# Patient Record
Sex: MALE | Race: WHITE
[De-identification: names, ages, dates, MRNs, and addresses within clinical notes are randomized per-mention and may not be internally consistent; named-entity substitution may affect disease eponyms.]

---

## 2017-02-14 ENCOUNTER — HOSPITAL ENCOUNTER (OUTPATIENT)
Dept: HOSPITAL 45 - C.LABBFT | Age: 82
Discharge: HOME | End: 2017-02-14
Attending: INTERNAL MEDICINE
Payer: COMMERCIAL

## 2017-02-14 DIAGNOSIS — D64.9: Primary | ICD-10-CM

## 2017-02-14 LAB
ALBUMIN/GLOB SERPL: 1.1 {RATIO} (ref 0.9–2)
ALP SERPL-CCNC: 70 U/L (ref 45–117)
ALT SERPL-CCNC: 25 U/L (ref 12–78)
ANION GAP SERPL CALC-SCNC: 11 MMOL/L (ref 3–11)
AST SERPL-CCNC: 23 U/L (ref 15–37)
BUN SERPL-MCNC: 18 MG/DL (ref 7–18)
BUN/CREAT SERPL: 13.1 (ref 10–20)
CALCIUM SERPL-MCNC: 8.9 MG/DL (ref 8.5–10.1)
CHLORIDE SERPL-SCNC: 104 MMOL/L (ref 98–107)
CHOLEST/HDLC SERPL: 3.4 {RATIO}
CO2 SERPL-SCNC: 25 MMOL/L (ref 21–32)
CREAT SERPL-MCNC: 1.4 MG/DL (ref 0.6–1.4)
EOSINOPHIL NFR BLD AUTO: 169 K/UL (ref 130–400)
GLOBULIN SER-MCNC: 3.5 GM/DL (ref 2.5–4)
GLUCOSE SERPL-MCNC: 93 MG/DL (ref 70–99)
GLUCOSE UR QL: 45 MG/DL
HCT VFR BLD CALC: 41.2 % (ref 42–52)
KETONES UR QL STRIP: 86 MG/DL
MCH RBC QN AUTO: 32.5 PG (ref 25–34)
MCHC RBC AUTO-ENTMCNC: 34.5 G/DL (ref 32–36)
MCV RBC AUTO: 94.3 FL (ref 80–100)
NITRITE UR QL STRIP: 114 MG/DL (ref 0–150)
PH UR: 154 MG/DL (ref 0–200)
PMV BLD AUTO: 11.8 FL (ref 7.4–10.4)
POTASSIUM SERPL-SCNC: 4.3 MMOL/L (ref 3.5–5.1)
RBC # BLD AUTO: 4.37 M/UL (ref 4.7–6.1)
SODIUM SERPL-SCNC: 140 MMOL/L (ref 136–145)
TSH SERPL-ACNC: 3.84 UIU/ML (ref 0.3–4.5)
VERY LOW DENSITY LIPOPROT CALC: 23 MG/DL
WBC # BLD AUTO: 7.41 K/UL (ref 4.8–10.8)

## 2018-02-20 ENCOUNTER — HOSPITAL ENCOUNTER (OUTPATIENT)
Dept: HOSPITAL 45 - C.LABBFT | Age: 83
Discharge: HOME | End: 2018-02-20
Attending: INTERNAL MEDICINE
Payer: COMMERCIAL

## 2018-02-20 DIAGNOSIS — I10: Primary | ICD-10-CM

## 2018-02-20 DIAGNOSIS — E03.9: ICD-10-CM

## 2018-02-20 DIAGNOSIS — I48.2: ICD-10-CM

## 2018-02-20 DIAGNOSIS — Z79.01: ICD-10-CM

## 2018-02-20 LAB
ALBUMIN SERPL-MCNC: 3.6 GM/DL (ref 3.4–5)
ALP SERPL-CCNC: 81 U/L (ref 45–117)
ALT SERPL-CCNC: 27 U/L (ref 12–78)
AST SERPL-CCNC: 22 U/L (ref 15–37)
BUN SERPL-MCNC: 22 MG/DL (ref 7–18)
CALCIUM SERPL-MCNC: 8.9 MG/DL (ref 8.5–10.1)
CO2 SERPL-SCNC: 29 MMOL/L (ref 21–32)
CREAT SERPL-MCNC: 1.52 MG/DL (ref 0.6–1.4)
EOSINOPHIL NFR BLD AUTO: 153 K/UL (ref 130–400)
GLUCOSE SERPL-MCNC: 92 MG/DL (ref 70–99)
HCT VFR BLD CALC: 40.9 % (ref 42–52)
HGB BLD-MCNC: 14.1 G/DL (ref 14–18)
MCH RBC QN AUTO: 32.3 PG (ref 25–34)
MCHC RBC AUTO-ENTMCNC: 34.5 G/DL (ref 32–36)
MCV RBC AUTO: 93.6 FL (ref 80–100)
PMV BLD AUTO: 11.6 FL (ref 7.4–10.4)
POTASSIUM SERPL-SCNC: 4.2 MMOL/L (ref 3.5–5.1)
PROT SERPL-MCNC: 7.5 GM/DL (ref 6.4–8.2)
RED CELL DISTRIBUTION WIDTH CV: 13.4 % (ref 11.5–14.5)
RED CELL DISTRIBUTION WIDTH SD: 46.4 FL (ref 36.4–46.3)
SODIUM SERPL-SCNC: 137 MMOL/L (ref 136–145)
WBC # BLD AUTO: 7.42 K/UL (ref 4.8–10.8)

## 2018-03-19 ENCOUNTER — HOSPITAL ENCOUNTER (OUTPATIENT)
Dept: HOSPITAL 45 - C.EDB | Age: 83
Setting detail: OBSERVATION
LOS: 2 days | Discharge: HOME | End: 2018-03-21
Attending: HOSPITALIST | Admitting: STUDENT IN AN ORGANIZED HEALTH CARE EDUCATION/TRAINING PROGRAM
Payer: COMMERCIAL

## 2018-03-19 VITALS
BODY MASS INDEX: 32.25 KG/M2 | HEIGHT: 67 IN | BODY MASS INDEX: 32.25 KG/M2 | HEIGHT: 67 IN | WEIGHT: 205.47 LBS | WEIGHT: 205.47 LBS

## 2018-03-19 DIAGNOSIS — R79.89: ICD-10-CM

## 2018-03-19 DIAGNOSIS — R07.9: Primary | ICD-10-CM

## 2018-03-19 DIAGNOSIS — I48.0: ICD-10-CM

## 2018-03-19 DIAGNOSIS — Z79.01: ICD-10-CM

## 2018-03-19 DIAGNOSIS — E03.9: ICD-10-CM

## 2018-03-19 DIAGNOSIS — K81.1: ICD-10-CM

## 2018-03-19 DIAGNOSIS — K21.9: ICD-10-CM

## 2018-03-19 DIAGNOSIS — Z79.82: ICD-10-CM

## 2018-03-19 DIAGNOSIS — N18.9: ICD-10-CM

## 2018-03-19 LAB
ALBUMIN SERPL-MCNC: 3.9 GM/DL (ref 3.4–5)
ALP SERPL-CCNC: 98 U/L (ref 45–117)
ALT SERPL-CCNC: 47 U/L (ref 12–78)
AST SERPL-CCNC: 70 U/L (ref 15–37)
BASOPHILS # BLD: 0.03 K/UL (ref 0–0.2)
BASOPHILS NFR BLD: 0.3 %
BUN SERPL-MCNC: 23 MG/DL (ref 7–18)
CA-I BLD-SCNC: 1.16 MMOL/L (ref 1.12–1.32)
CALCIUM SERPL-MCNC: 8.9 MG/DL (ref 8.5–10.1)
CK MB SERPL-MCNC: 2.4 NG/ML (ref 0.5–3.6)
CO2 SERPL-SCNC: 29 MMOL/L (ref 21–32)
CREAT BLD-MCNC: 1.4 MG/DL (ref 0.6–1.3)
CREAT SERPL-MCNC: 1.46 MG/DL (ref 0.6–1.4)
EOS ABS #: 0.27 K/UL (ref 0–0.5)
EOSINOPHIL NFR BLD AUTO: 143 K/UL (ref 130–400)
GLUCOSE SERPL-MCNC: 106 MG/DL (ref 70–99)
HCT VFR BLD CALC: 40.7 % (ref 42–52)
HGB BLD-MCNC: 14.2 G/DL (ref 14–18)
IG#: 0.02 K/UL (ref 0–0.02)
IMM GRANULOCYTES NFR BLD AUTO: 11.3 %
INR PPP: 1.7 (ref 0.9–1.1)
ISTAT POTASSIUM: 4.2 MEQ/L (ref 3.3–5)
LIPASE: 143 U/L (ref 73–393)
LYMPHOCYTES # BLD: 1.18 K/UL (ref 1.2–3.4)
MCH RBC QN AUTO: 32.2 PG (ref 25–34)
MCHC RBC AUTO-ENTMCNC: 34.9 G/DL (ref 32–36)
MCV RBC AUTO: 92.3 FL (ref 80–100)
MONO ABS #: 0.67 K/UL (ref 0.11–0.59)
MONOCYTES NFR BLD: 6.4 %
NEUT ABS #: 8.26 K/UL (ref 1.4–6.5)
NEUTROPHILS # BLD AUTO: 2.6 %
NEUTROPHILS NFR BLD AUTO: 79.2 %
PMV BLD AUTO: 11 FL (ref 7.4–10.4)
POTASSIUM SERPL-SCNC: 4.2 MMOL/L (ref 3.5–5.1)
PROT SERPL-MCNC: 7.8 GM/DL (ref 6.4–8.2)
PTT PATIENT: 33 SECONDS (ref 21–31)
RED CELL DISTRIBUTION WIDTH CV: 13.5 % (ref 11.5–14.5)
RED CELL DISTRIBUTION WIDTH SD: 45.7 FL (ref 36.4–46.3)
SODIUM BLD-SCNC: 137 MEQ/L (ref 135–144)
SODIUM SERPL-SCNC: 135 MMOL/L (ref 136–145)
WBC # BLD AUTO: 10.43 K/UL (ref 4.8–10.8)

## 2018-03-19 NOTE — EMERGENCY ROOM VISIT NOTE
History


Report prepared by Bakari:  Ibrahima Lousi


Under the Supervision of:  Dr. Gerald Frost M.D.


First contact with patient:  21:01


Chief Complaint:  CHEST PAIN


Stated Complaint:  CHEST PRESSURE AND INDIGESTION





History of Present Illness


The patient is an 82 year old male who presents to the Emergency Room with 

complaints of improving chest pain starting around 1700 tonight. He describes 

the pain as a burning sensation. The patient additionally notes that he has 

been belching and having some indigestion, and the pain came before eating 

dinner. He also was feeling warm, and he states that he has upper back pain, 

though he says that he pulled a muscle a couple of days ago. The patient states 

that he has had indigestion for the past couple of weeks, though he states that 

it has felt a little it different today. He has a history of A-fib, and he 

states that he has not had any bypass or stents placed. He states that he has 

not been around anyone sick recently that he knows of. Pt denies LOC, headache, 

fevers, chills, diaphoresis, visual changes, neck pain, breathing difficulties, 

nausea, vomiting, abdominal pain, melena, hematochezia, urinary symptoms, 

numbness, weakness, lymphadenopathy, rash, or other complaints.





   Source of History:  patient


   Onset:  1700 tonight


   Position:  chest


   Quality:  burning


   Timing:  other (improving)


   Associated Symptoms:  + back pain


Note:


Associated symptoms: Belching and indigestion





Review of Systems


See HPI for pertinent positives and negatives.  A total of ten systems were 

reviewed and were otherwise negative.





Past Medical & Surgical


Medical Problems:


(1) Atrial fibrillation


(2) Nasal bleeding








Social History


Smoking Status:  Never Smoker


Alcohol Use:  none


Marital Status:  


Occupation Status:  retired





Current/Historical Medications


Scheduled


Aspirin Enteric Coated (Ecotrin Or Generic *), 81 MG PO DAILY


Losartan Potassium & Hydrochlo (Hyzaar), 1 TABLET PO DAILY


Metoprolol Succ (Toprol Xl) (Toprol-Xl ), 100 MG PO DAILY


Pantoprazole (Protonix), 40 MG PO DAILY


Warfarin Sod (Coumadin *), 2.5 MG PO 3XWK


Warfarin Sod (Coumadin *), 5 MG PO 4XWK





Allergies


Coded Allergies:  


     No Known Allergies (Verified  Allergy, Unknown, 4/4/05)





Physical Exam


Vital Signs











  Date Time  Temp Pulse Resp B/P (MAP) Pulse Ox O2 Delivery O2 Flow Rate FiO2


 


3/19/18 21:42      Room Air  


 


3/19/18 21:42      Room Air  


 


3/19/18 21:16  82      


 


3/19/18 20:49 36.3 92 20 158/94 96 Room Air  











Physical Exam


GENERAL: Awake, alert, well-appearing, in no distress


HENT: Normocephalic, atraumatic. Oropharynx unremarkable.


EYES: Normal conjunctiva. Sclera non-icteric.


NECK: Supple. No nuchal rigidity. FROM. No masses.


RESPIRATORY: Clear to auscultation. No wheezes. No rales.  Normal respiratory 

effort.


CARDIAC: Normal rate.  Normal rhythm. No murmurs.  No rubs. Extremities warm 

and well perfused. Pulses equal.  No JVD.


GI: Soft, non-distended. No tenderness to palpation. No rebound or guarding. No 

masses.


RECTAL: Deferred.


MUSCULOSKELETAL: Atraumatic. Chest examination reveals no tenderness. The back 

is symmetrical on inspection without obvious abnormality. There is no CVA 

tenderness to palpation. No joint edema. 


LOWER EXTREMITIES: 1+ edema. Calves are equal size bilaterally and non-tender. 

No discoloration. 


NEURO: Normal sensorium. No sensory or motor deficits noted. 


SKIN: No rash or jaundice noted.





Medical Decision & Procedures


ER Provider


Diagnostic Interpretation:


Radiology results as stated below per my review and radiologist interpretation: 











CHEST ONE VIEW PORTABLE





CLINICAL HISTORY: Atypical chest pain    





COMPARISON STUDY:  April 2006





FINDINGS: The cardiac and mediastinal contours remain stable. There is stable


elevation the right hemidiaphragm. There is no failure. There is no lobar


consolidation. There are no pleural effusions. There are mild basilar


atelectatic changes.[ 





IMPRESSION: No active disease in the chest.





Electronically signed by:  Maged Patterson M.D.


3/19/2018 9:32 PM





Dictated Date/Time:  3/19/2018 9:32 PM





Laboratory Results


3/19/18 21:10








Red Blood Count 4.41, Mean Corpuscular Volume 92.3, Mean Corpuscular Hemoglobin 

32.2, Mean Corpuscular Hemoglobin Concent 34.9, Mean Platelet Volume 11.0, 

Neutrophils (%) (Auto) 79.2, Lymphocytes (%) (Auto) 11.3, Monocytes (%) (Auto) 

6.4, Eosinophils (%) (Auto) 2.6, Basophils (%) (Auto) 0.3, Neutrophils # (Auto) 

8.26, Lymphocytes # (Auto) 1.18, Monocytes # (Auto) 0.67, Eosinophils # (Auto) 

0.27, Basophils # (Auto) 0.03














Test


  3/19/18


21:10 3/19/18


21:20


 


White Blood Count


  10.43 K/uL


(4.8-10.8) 


 


 


Red Blood Count


  4.41 M/uL


(4.7-6.1) 


 


 


Hemoglobin


  14.2 g/dL


(14.0-18.0) 


 


 


Hematocrit 40.7 % (42-52)  


 


Mean Corpuscular Volume


  92.3 fL


() 


 


 


Mean Corpuscular Hemoglobin


  32.2 pg


(25-34) 


 


 


Mean Corpuscular Hemoglobin


Concent 34.9 g/dl


(32-36) 


 


 


Platelet Count


  143 K/uL


(130-400) 


 


 


Mean Platelet Volume


  11.0 fL


(7.4-10.4) 


 


 


Neutrophils (%) (Auto) 79.2 %  


 


Lymphocytes (%) (Auto) 11.3 %  


 


Monocytes (%) (Auto) 6.4 %  


 


Eosinophils (%) (Auto) 2.6 %  


 


Basophils (%) (Auto) 0.3 %  


 


Neutrophils # (Auto)


  8.26 K/uL


(1.4-6.5) 


 


 


Lymphocytes # (Auto)


  1.18 K/uL


(1.2-3.4) 


 


 


Monocytes # (Auto)


  0.67 K/uL


(0.11-0.59) 


 


 


Eosinophils # (Auto)


  0.27 K/uL


(0-0.5) 


 


 


Basophils # (Auto)


  0.03 K/uL


(0-0.2) 


 


 


RDW Standard Deviation


  45.7 fL


(36.4-46.3) 


 


 


RDW Coefficient of Variation


  13.5 %


(11.5-14.5) 


 


 


Immature Granulocyte % (Auto) 0.2 %  


 


Immature Granulocyte # (Auto)


  0.02 K/uL


(0.00-0.02) 


 


 


Prothrombin Time


  17.9 SECONDS


(9.0-12.0) 


 


 


Prothromb Time International


Ratio 1.7 (0.9-1.1) 


  


 


 


Activated Partial


Thromboplast Time 33.0 SECONDS


(21.0-31.0) 


 


 


Partial Thromboplastin Ratio 1.3  


 


Total Bilirubin


  0.8 mg/dl


(0.2-1) 


 


 


Direct Bilirubin


  0.4 mg/dl


(0-0.2) 


 


 


Aspartate Amino Transf


(AST/SGOT) 70 U/L (15-37) 


  


 


 


Alanine Aminotransferase


(ALT/SGPT) 47 U/L (12-78) 


  


 


 


Alkaline Phosphatase


  98 U/L


() 


 


 


Total Creatine Kinase


  125 U/L


() 


 


 


Creatine Kinase MB


  2.4 ng/ml


(0.5-3.6) 


 


 


Creatine Kinase MB Ratio 1.9 (0-3.0)  


 


Troponin I


  < 0.015 ng/ml


(0-0.045) 


 


 


Total Protein


  7.8 gm/dl


(6.4-8.2) 


 


 


Albumin


  3.9 gm/dl


(3.4-5.0) 


 


 


Lipase


  143 U/L


() 


 


 


Bedside Hemoglobin


  


  15.0 g/dl


(14.0-18.0)


 


Bedside Hematocrit  44 % (42-52) 


 


Bedside Sodium


  


  137 mEq/L


(135-144)


 


Bedside Potassium


  


  4.2 mEq/L


(3.3-5.0)


 


Bedside Chloride


  


  99 mEq/L


(101-112)


 


Bedside Total CO2


  


  28 mEq/l


(24-31)


 


Anion Gap


  


  15.0 mmol/L


(16-25)


 


Bedside Blood Urea Nitrogen


  


  24 mg/dl


(7-18)


 


Bedside Creatinine


  


  1.4 mg/dl


(0.6-1.3)


 


Bedside Glucose (other)


  


  115 mg/dl


(70-99)


 


Bedside Ionized Calcium (Mary)


  


  1.16 mmol/l


(1.12-1.32)








Laboratory results reviewed by me





ECG Per My Interpretation


Indication:  chest pain


Rate (beats per minute):  76


Rhythm:  atrial fibrillation


Findings:  nonspecific-ST abn, ST depression (subtle anterior)


Comparison ECG Date:  3/7/11


Change:


A fib has replaced sinus and ST segment changes are now present.





ED Course


2101: The patient was evaluated in room C12. A complete history and physical 

exam was performed.





Medical Decision





Triage Nursing notes reviewed and agree them.





Additional history obtained from the family.





The patient's history was concerning for chest pain.  





Differential diagnosis:


Etiologies such as cardiac ischemia, aortic dissection, pulmonary embolism, 

pneumonia, pneumothorax, musculoskeletal, infections, pericarditis, myocarditis

, esophageal rupture, gastrointestinal, as well as others were entertained. 





Physical examination:


As above.


 





ER treatment provided:


No medication given.  The patient had taken aspirin and warfarin today.  The 

patient was pain-free.


On reassessment the patient felt well.





Diagnostic interpretation by me:


The electrocardiogram as above.  Subtle ST changes.





The labs revealed an unremarkable CBC and chemistry panel.  Cardiac markers 

negative.  Mild elevation of LFTs.





Imaging studies:


Chest x-ray as above.  Ultrasound pending.





The patient has some worrisome chest pain.  Further cardiac evaluation will be 

necessary.





Consultation:


A consultation was placed with the hospitalist. The case was discussed and 

diagnostics were reviewed.  The patient was evaluated in the ER for further 

treatment.





Medication Reconcilliation


Current Medication List:  was personally reviewed by me





Blood Pressure Screening


Patient's blood pressure:  Elevated blood pressure


Monitored by the hospitalist





Consults


Consulting Physician:  Dr. Lanier and Dr. Ab Bryson [facility] Hospitalist





Impression





 Primary Impression:  


 Substernal chest pain





Scribe Attestation


The scribe's documentation has been prepared under my direction and personally 

reviewed by me in its entirety. I confirm that the note above accurately 

reflects all work, treatment, procedures, and medical decision making performed 

by me.





Departure Information


Dispostion


Being Evaluated By Hospitalist





Referrals


Lio Whiteside M.D. (PCP)





Patient Instructions


My [facility]

## 2018-03-20 VITALS
TEMPERATURE: 97.52 F | HEART RATE: 85 BPM | DIASTOLIC BLOOD PRESSURE: 98 MMHG | SYSTOLIC BLOOD PRESSURE: 175 MMHG | OXYGEN SATURATION: 94 %

## 2018-03-20 VITALS
SYSTOLIC BLOOD PRESSURE: 160 MMHG | DIASTOLIC BLOOD PRESSURE: 84 MMHG | TEMPERATURE: 97.52 F | OXYGEN SATURATION: 96 % | HEART RATE: 83 BPM

## 2018-03-20 VITALS — SYSTOLIC BLOOD PRESSURE: 144 MMHG | DIASTOLIC BLOOD PRESSURE: 95 MMHG

## 2018-03-20 VITALS
OXYGEN SATURATION: 94 % | TEMPERATURE: 98.24 F | SYSTOLIC BLOOD PRESSURE: 111 MMHG | DIASTOLIC BLOOD PRESSURE: 65 MMHG | HEART RATE: 44 BPM

## 2018-03-20 VITALS
SYSTOLIC BLOOD PRESSURE: 130 MMHG | HEART RATE: 71 BPM | DIASTOLIC BLOOD PRESSURE: 81 MMHG | OXYGEN SATURATION: 95 % | TEMPERATURE: 98.24 F

## 2018-03-20 VITALS
TEMPERATURE: 97.34 F | OXYGEN SATURATION: 95 % | HEART RATE: 71 BPM | DIASTOLIC BLOOD PRESSURE: 75 MMHG | SYSTOLIC BLOOD PRESSURE: 151 MMHG

## 2018-03-20 VITALS — SYSTOLIC BLOOD PRESSURE: 175 MMHG | DIASTOLIC BLOOD PRESSURE: 98 MMHG | TEMPERATURE: 97.52 F

## 2018-03-20 VITALS
HEART RATE: 89 BPM | TEMPERATURE: 98.24 F | SYSTOLIC BLOOD PRESSURE: 153 MMHG | OXYGEN SATURATION: 96 % | DIASTOLIC BLOOD PRESSURE: 93 MMHG

## 2018-03-20 VITALS — OXYGEN SATURATION: 97 %

## 2018-03-20 LAB
ALBUMIN SERPL-MCNC: 3.7 GM/DL (ref 3.4–5)
ALP SERPL-CCNC: 110 U/L (ref 45–117)
ALT SERPL-CCNC: 155 U/L (ref 12–78)
AST SERPL-CCNC: 215 U/L (ref 15–37)
BASOPHILS # BLD: 0.02 K/UL (ref 0–0.2)
BASOPHILS NFR BLD: 0.2 %
BUN SERPL-MCNC: 22 MG/DL (ref 7–18)
CALCIUM SERPL-MCNC: 8.9 MG/DL (ref 8.5–10.1)
CO2 SERPL-SCNC: 27 MMOL/L (ref 21–32)
CREAT SERPL-MCNC: 1.43 MG/DL (ref 0.6–1.4)
EOS ABS #: 0.15 K/UL (ref 0–0.5)
EOSINOPHIL NFR BLD AUTO: 139 K/UL (ref 130–400)
GLUCOSE SERPL-MCNC: 113 MG/DL (ref 70–99)
HCT VFR BLD CALC: 39.9 % (ref 42–52)
HGB BLD-MCNC: 13.8 G/DL (ref 14–18)
IG#: 0.01 K/UL (ref 0–0.02)
IMM GRANULOCYTES NFR BLD AUTO: 14.1 %
LYMPHOCYTES # BLD: 1.25 K/UL (ref 1.2–3.4)
MCH RBC QN AUTO: 31.9 PG (ref 25–34)
MCHC RBC AUTO-ENTMCNC: 34.6 G/DL (ref 32–36)
MCV RBC AUTO: 92.4 FL (ref 80–100)
MONO ABS #: 0.54 K/UL (ref 0.11–0.59)
MONOCYTES NFR BLD: 6.1 %
NEUT ABS #: 6.87 K/UL (ref 1.4–6.5)
NEUTROPHILS # BLD AUTO: 1.7 %
NEUTROPHILS NFR BLD AUTO: 77.8 %
PMV BLD AUTO: 11.2 FL (ref 7.4–10.4)
POTASSIUM SERPL-SCNC: 4.2 MMOL/L (ref 3.5–5.1)
PROT SERPL-MCNC: 7.5 GM/DL (ref 6.4–8.2)
RED CELL DISTRIBUTION WIDTH CV: 13.2 % (ref 11.5–14.5)
RED CELL DISTRIBUTION WIDTH SD: 44.8 FL (ref 36.4–46.3)
SODIUM SERPL-SCNC: 134 MMOL/L (ref 136–145)
WBC # BLD AUTO: 8.84 K/UL (ref 4.8–10.8)

## 2018-03-20 RX ADMIN — Medication SCH MG: at 14:47

## 2018-03-20 RX ADMIN — PANTOPRAZOLE SCH MG: 40 TABLET, DELAYED RELEASE ORAL at 14:49

## 2018-03-20 RX ADMIN — DOCUSATE SODIUM SCH MG: 100 CAPSULE, LIQUID FILLED ORAL at 09:00

## 2018-03-20 RX ADMIN — DOCUSATE SODIUM SCH MG: 100 CAPSULE, LIQUID FILLED ORAL at 14:47

## 2018-03-20 RX ADMIN — METOPROLOL SUCCINATE SCH MG: 50 TABLET, EXTENDED RELEASE ORAL at 14:49

## 2018-03-20 RX ADMIN — DOCUSATE SODIUM SCH MG: 100 CAPSULE, LIQUID FILLED ORAL at 20:18

## 2018-03-20 NOTE — DIAGNOSTIC IMAGING REPORT
ABD/PELVIS WITHOUT FOR STONE



HISTORY:  82 years-old Male deranged LFTs in 6 hours acutely elevated LFTs



COMPARISON: Right upper quadrant ultrasound 4/17/2017



TECHNIQUE: Multiple axial CT images of the abdomen and pelvis were obtained

without the use of IV contrast. A dose lowering technique was used consistent

with the principals of GIGI.



FINDINGS: 

Pleural calcifications are seen at the level the left lung base. Scattered areas

of hyperattenuation are noted at the bilateral lung bases with areas of adjacent

subsegmental atelectasis/scarring. Bilateral bronchial wall thickening suggests

bronchitis. Solid nodules of the left lower lobe are seen measuring up to 4 mm.

No pneumatosis or pneumoperitoneum. Imaged inferior cardiac chambers are

enlarged with coronary arterial disease. The pulmonary arteries also appear

prominent in size. There is bilateral hemidiaphragmatic elevation.



Evaluation of the solid abdominal organs including the liver is limited without

the use of IV contrast. Within the limitations of the study, the liver, spleen,

gallbladder and adrenal glands are within normal limits. There is mild

nonspecific mesenteric edema adjacent to the gallbladder neck within the

distribution of the pasha hepatis. Moderate generalized pancreatic atrophy. No

pancreatic ductal dilation. No intrahepatic biliary ductal dilation.



Mild nonspecific bilateral perinephric stranding. 4 mm fatty attenuating lesion

of the interpolar right kidney suggests renal angiomyolipoma. Ureters are

unremarkable. No renal calculi or obstructive uropathy. Decompressed urinary

bladder lumen. Phleboliths of the pelvis.



Moderate atherosclerosis of the aorta without aneurysm. No bulky adenopathy.

Fluid is noted within the distal esophagus. No bowel obstruction or focal bowel

wall thickening identified. Normal appendix.



Soft tissues are unremarkable. Bones appear mildly demineralized. Remote

left-sided rib fractures. Severe facet arthrosis of the lower lumbar spine with

remote bilateral pars defects at L5. 5 mm anterolisthesis L5 on S1. Lucency

involving the left aspect of the L2 vertebral body suggests a Schmorl's node.



IMPRESSION: 

1. Mild nonspecific stranding adjacent to the gallbladder neck and pasha

hepatis. No cholelithiasis or CT evidence of acute cholecystitis.

2. No bowel obstruction or focal bowel wall thickening.

3. Additional findings as above.







The above report was generated using voice recognition software. It may contain

grammatical, syntax or spelling errors.







Electronically signed by:  Jovon Krishna M.D.

3/20/2018 7:11 AM



Dictated Date/Time:  3/20/2018 7:03 AM

## 2018-03-20 NOTE — SURGERY CONSULTATION
Consultation


Date of Consultation:


Mar 20, 2018.


Attending Physician:


Gt Cartwright D.O.


Reason for Consultation:


cholecystitis


History of Present Illness


Patient is a 82M who presented to the ED last night due to chest/epigastric 

pain he felt was due to indigestion. He has had some associated belching as 

well. Reports he did take some antacids and tylenol a couple days ago which 

greatly improved his symptoms. States the pain came on before he ate dinner. He 

also reports some pain in the middle of his back as well, but feels he may have 

pulled a muscle approximately 2 days ago. No white count but his LFTs were 

elevated so an U/S acute cholecystitis which came back negative. He continued 

to have symptoms so a CT scan and HIDA scan were performed this AM. The CT came 

back negative for acute cholecystitis and his HIDA came back with a decreased 

EF indicating possible chronic cholecystitis. Denies fever/chills/nausea/

vomiting/recent illness. He has been moving his bowels and urinating without 

trouble. Last ate approximately 1 hour ago. Denies any pain associated with 

meals. Denies right shoulder pain. Last BM Monday morning which he reports was 

normal for him. PSHx significant for lung biopsy and right inguinal hernia 

repair with mesh. Last colonoscopy 10 years ago which he reports was normal. 

Denies FHx of gallbladder disease. PMHx significant for A-fib, CKD and HTN. He 

currently takes Aspirin 81mg PO QD and Warfarin 5mg 4 times a week. Denies use 

of other blood thinning or anticoagulant medications.





Past Medical/Surgical History


Medical Problems:


(1) Substernal chest pain


Status: Acute  











Social History


Smoking Status:  Never Smoker


Marital Status:  


Occupation Status:  retired





Allergies


Coded Allergies:  


     No Known Allergies (Verified  Allergy, Unknown, 4/4/05)





Home Medications


Scheduled


Aspirin (Aspirin Ec), 81 MG PO DAILY


Docusate Sodium (Stool Softener), 100 MG PO TID


Losartan Potassium & Hydrochlo (Hyzaar), 1 TABLET PO DAILY


Metoprolol Succ (Toprol Xl) (Toprol-Xl), 50 MG PO DAILY


Pantoprazole (Protonix), 40 MG PO DAILY


Warfarin Sodium (Warfarin Sodium), 2.5 MG PO 3XWK


Warfarin Sodium (Warfarin Sodium), 5 MG PO 4XWK





Scheduled PRN


Alum & Mag Hydrox-Simethicone (Mylanta), 1 DOSE PO UD PRN for Indigestion


Ranitidine Hcl (Zantac), 150 MG PO BID PRN for Heartburn





Current Inpatient Medications





Current Inpatient Medications








 Medications


  (Trade)  Dose


 Ordered  Sig/[employer]ner


 Route  Start Time


 Stop Time Status Last Admin


Dose Admin


 


 Acetaminophen


  (Tylenol Tab)  650 mg  Q4H  PRN


 PO  3/20/18 01:45


 4/19/18 01:44  3/20/18 05:10


650 MG


 


 Al Hydrox/Mg


 Hydrox/Simethicone


  (Maalox Max Susp)  15 ml  Q4H  PRN


 PO  3/20/18 01:45


 4/19/18 01:44  3/20/18 05:09


15 ML


 


 Magnesium


 Hydroxide


  (Milk Of


 Magnesia Susp)  30 ml  Q12H  PRN


 PO  3/20/18 01:45


 4/19/18 01:44   


 


 


 Ondansetron HCl


  (Zofran Inj)  4 mg  Q6H  PRN


 IV  3/20/18 01:45


 4/19/18 01:44   


 


 


 Nitroglycerin


  (Nitrostat Tab)  0.4 mg  UD  PRN


 SL  3/20/18 01:45


 4/19/18 01:44   


 


 


 Polyethylene


  (Miralax Powder


 Packet)  17 gm  DAILY  PRN


 PO  3/20/18 01:45


 4/19/18 01:44   


 


 


 Aspirin


  (Ecotrin Tab)  81 mg  DAILY


 PO  3/20/18 09:00


 4/19/18 08:59  3/20/18 14:47


81 MG


 


 Docusate Sodium


  (coLACE CAP)  100 mg  TID


 PO  3/20/18 09:00


 4/19/18 08:59  3/20/18 14:47


100 MG


 


 Metoprolol


 Succinate


  (Toprol Xl Tab)  50 mg  DAILY


 PO  3/20/18 09:00


 4/19/18 08:59  3/20/18 14:49


50 MG


 


 Pantoprazole


 Sodium


  (Protonix Tab)  40 mg  DAILY


 PO  3/20/18 09:00


 4/19/18 08:59  3/20/18 14:49


40 MG


 


 Ranitidine HCl


  (zANTac TAB)  150 mg  BID  PRN


 PO  3/20/18 01:45


 4/19/18 01:44   


 


 


 Warfarin Sodium


  (Coumadin Tab)  2.5 mg  MoWeFr@1600


 PO  3/21/18 16:00


 4/20/18 15:59   


 


 


 Warfarin Sodium


  (Coumadin Tab)  5 mg  SuTuThSa@1600


 PO  3/20/18 16:00


 4/19/18 15:59  3/20/18 16:35


5 MG


 


 Miscellaneous


  (Iv Fluids


 Completed)  1 ea  PRN  PRN


 N/A  3/20/18 03:15


 3/20/19 03:14   


 











Review of Systems


Constitutional:  No fever, No chills


Respiratory:  No shortness of breath


Cardiovascular:  No chest pain


Abdomen:  + pain (Epigastric), + problem reported (Reports abdominal gas), No 

nausea, No vomiting, No diarrhea, No constipation


Musculoskeletal:  + problem reported (back pain. )


Genitourinary - Male:  No hematuria, No dysuria


Integumentary:  No rash, No color change





Physical Exam











  Date Time  Temp Pulse Resp B/P (MAP) Pulse Ox O2 Delivery O2 Flow Rate FiO2


 


3/20/18 16:00      Room Air  


 


3/20/18 15:55 36.4 83 18 160/84 (109) 96 Room Air  


 


3/20/18 12:00      Room Air  


 


3/20/18 11:06 36.3 71 16 151/75 (100) 95 Room Air  


 


3/20/18 08:00      Room Air  


 


3/20/18 07:25 36.8 71 16 130/81 (97) 95 Room Air  


 


3/20/18 06:34    144/95 (111)    


 


3/20/18 04:00      Room Air  


 


3/20/18 02:54 36.4 85 16 175/98 (123) 94 Room Air  


 


3/20/18 02:50 36.4  16 175/98  Room Air  


 


3/20/18 02:18  78 16 149/102 97   


 


3/20/18 02:11  78 16 149/102 97 Room Air  


 


3/20/18 01:02  78      


 


3/20/18 00:49  69 18 153/93 97 Room Air  


 


3/19/18 23:14  71 18 165/101 96 Room Air  


 


3/19/18 21:42      Room Air  


 


3/19/18 21:42      Room Air  


 


3/19/18 21:16  82      


 


3/19/18 20:49 36.3 92 20 158/94 96 Room Air  








Patient sitting up on side of bed conversing with family in the room. Patient 

reports he is in no pain at this time.


General Appearance:  WD/WN, no apparent distress


Head:  normocephalic, atraumatic


ENT:  hearing grossly normal


Respiratory/Chest:  no respiratory distress, no accessory muscle use


Abdomen/GI:  normal bowel sounds, non tender, soft, no organomegaly, no 

pulsatile mass, + pertinent finding ((-) murphys sign, no RUQ tenderness)


Extremities/Musculoskelatal:  normal inspection, + pertinent finding (No TTP in 

back where patient states he has had off and on pain. )


Neurologic/Psych:  alert, normal mood/affect, oriented x 3


Skin:  normal color, warm/dry





Laboratory Results





Last 24 Hours








Test


  3/19/18


21:10 3/19/18


21:20 3/20/18


03:01 3/20/18


09:42


 


White Blood Count 10.43 K/uL   8.84 K/uL  


 


Red Blood Count 4.41 M/uL   4.32 M/uL  


 


Hemoglobin 14.2 g/dL   13.8 g/dL  


 


Hematocrit 40.7 %   39.9 %  


 


Mean Corpuscular Volume 92.3 fL   92.4 fL  


 


Mean Corpuscular Hemoglobin 32.2 pg   31.9 pg  


 


Mean Corpuscular Hemoglobin


Concent 34.9 g/dl 


  


  34.6 g/dl 


  


 


 


Platelet Count 143 K/uL   139 K/uL  


 


Mean Platelet Volume 11.0 fL   11.2 fL  


 


Neutrophils (%) (Auto) 79.2 %   77.8 %  


 


Lymphocytes (%) (Auto) 11.3 %   14.1 %  


 


Monocytes (%) (Auto) 6.4 %   6.1 %  


 


Eosinophils (%) (Auto) 2.6 %   1.7 %  


 


Basophils (%) (Auto) 0.3 %   0.2 %  


 


Neutrophils # (Auto) 8.26 K/uL   6.87 K/uL  


 


Lymphocytes # (Auto) 1.18 K/uL   1.25 K/uL  


 


Monocytes # (Auto) 0.67 K/uL   0.54 K/uL  


 


Eosinophils # (Auto) 0.27 K/uL   0.15 K/uL  


 


Basophils # (Auto) 0.03 K/uL   0.02 K/uL  


 


RDW Standard Deviation 45.7 fL   44.8 fL  


 


RDW Coefficient of Variation 13.5 %   13.2 %  


 


Immature Granulocyte % (Auto) 0.2 %   0.1 %  


 


Immature Granulocyte # (Auto) 0.02 K/uL   0.01 K/uL  


 


Prothrombin Time 17.9 SECONDS    


 


Prothromb Time International


Ratio 1.7 


  


  


  


 


 


Activated Partial


Thromboplast Time 33.0 SECONDS 


  


  


  


 


 


Partial Thromboplastin Ratio 1.3    


 


Sodium Level 135 mmol/L   134 mmol/L  


 


Potassium Level 4.2 mmol/L   4.2 mmol/L  


 


Chloride Level 101 mmol/L   101 mmol/L  


 


Carbon Dioxide Level 29 mmol/L   27 mmol/L  


 


Anion Gap 6.0 mmol/L  15.0 mmol/L  6.0 mmol/L  


 


Blood Urea Nitrogen 23 mg/dl   22 mg/dl  


 


Creatinine 1.46 mg/dl   1.43 mg/dl  


 


Est Creatinine Clear Calc


Drug Dose 43.0 ml/min 


  


  43.7 ml/min 


  


 


 


Estimated GFR (


American) 51.2 


  


  52.5 


  


 


 


Estimated GFR (Non-


American 44.2 


  


  45.3 


  


 


 


BUN/Creatinine Ratio 15.5   15.4  


 


Random Glucose 106 mg/dl   113 mg/dl  


 


Calcium Level 8.9 mg/dl   8.9 mg/dl  


 


Total Bilirubin 0.8 mg/dl   1.5 mg/dl  


 


Direct Bilirubin 0.4 mg/dl    


 


Aspartate Amino Transf


(AST/SGOT) 70 U/L 


  


  215 U/L 


  


 


 


Alanine Aminotransferase


(ALT/SGPT) 47 U/L 


  


  155 U/L 


  


 


 


Alkaline Phosphatase 98 U/L   110 U/L  


 


Total Creatine Kinase 125 U/L    


 


Creatine Kinase MB 2.4 ng/ml    


 


Creatine Kinase MB Ratio 1.9    


 


Troponin I < 0.015 ng/ml   < 0.015 ng/ml  < 0.015 ng/ml 


 


Total Protein 7.8 gm/dl   7.5 gm/dl  


 


Albumin 3.9 gm/dl   3.7 gm/dl  


 


Lipase 143 U/L   115 U/L  


 


Bedside Hemoglobin  15.0 g/dl   


 


Bedside Hematocrit  44 %   


 


Bedside Sodium  137 mEq/L   


 


Bedside Potassium  4.2 mEq/L   


 


Bedside Chloride  99 mEq/L   


 


Bedside Total CO2  28 mEq/l   


 


Bedside Blood Urea Nitrogen  24 mg/dl   


 


Bedside Creatinine  1.4 mg/dl   


 


Bedside Glucose (other)  115 mg/dl   


 


Bedside Ionized Calcium (Mary)  1.16 mmol/l   


 


Globulin   3.8 gm/dl  


 


Albumin/Globulin Ratio   1.0  


 


Test


  3/20/18


15:48 


  


  


 


 


Troponin I < 0.015 ng/ml    











Assessment & Plan


Epigastric discomfort, Reduced EF at 28% indicating some chronic cholecystitis, 

no findings of acute cholecystitis on CT or RUQ U/S


   Patient reports no pain/discomfort at this time. Tolerating AHA heart 

healthy diet, No N/V.


   Despite LFTs and HIDA findings, Symptoms seem to correlate more with GERD/

PUD rather than a gallbladder etiology.


   No surgical intervention indicated at this time. Repeat LFTs in AM.


   May consider outpatient f/u to discuss possible surgical options if he 

continues to have problems with this in the future. 


   Findings discussed with Dr. Lawrence. 


   Will continue to follow. Please contact with questions or concerns.

## 2018-03-20 NOTE — FAMILY MEDICINE PROGRESS NOTE
Progress Note


Date of Service


Mar 20, 2018.





Subjective


Pt evaluation today including:  conversation w/ patient, physical exam, chart 

review, lab review, review of studies


Pain:  intermittent mid sternal chest pain


PO Intake:  good


Denies any CP, SOB, palpitations or dizziness. No nausea, vomiting, abdominal 

pain, BRBPR, melena.





   Constitutional:  No fever, No chills


   Eyes:  No worsening of vision


   Respiratory:  No cough, No sputum, No wheezing, No shortness of breath, No 

dyspnea at rest


   Cardiovascular:  + problem reported (mis sternal chest pain)


   Breast:  No breast lump


   Abdomen:  No pain, No nausea, No vomiting, No GI bleeding


   Male :  No dysuria


   Neurologic:  No memory loss


   Psychiatric:  No depression symptoms





Medications





Current Inpatient Medications








 Medications


  (Trade)  Dose


 Ordered  [employer]


 Route  Start Time


 Stop Time Status Last Admin


Dose Admin


 


 Acetaminophen


  (Tylenol Tab)  650 mg  Q4H  PRN


 PO  3/20/18 01:45


 4/19/18 01:44  3/20/18 05:10


650 MG


 


 Al Hydrox/Mg


 Hydrox/Simethicone


  (Maalox Max Susp)  15 ml  Q4H  PRN


 PO  3/20/18 01:45


 4/19/18 01:44  3/20/18 05:09


15 ML


 


 Magnesium


 Hydroxide


  (Milk Of


 Magnesia Susp)  30 ml  Q12H  PRN


 PO  3/20/18 01:45


 4/19/18 01:44   


 


 


 Ondansetron HCl


  (Zofran Inj)  4 mg  Q6H  PRN


 IV  3/20/18 01:45


 4/19/18 01:44   


 


 


 Nitroglycerin


  (Nitrostat Tab)  0.4 mg  UD  PRN


 SL  3/20/18 01:45


 4/19/18 01:44   


 


 


 Polyethylene


  (Miralax Powder


 Packet)  17 gm  DAILY  PRN


 PO  3/20/18 01:45


 4/19/18 01:44   


 


 


 Aspirin


  (Ecotrin Tab)  81 mg  DAILY


 PO  3/20/18 09:00


 4/19/18 08:59   


 


 


 Docusate Sodium


  (coLACE CAP)  100 mg  TID


 PO  3/20/18 09:00


 4/19/18 08:59   


 


 


 Metoprolol


 Succinate


  (Toprol Xl Tab)  50 mg  DAILY


 PO  3/20/18 09:00


 4/19/18 08:59   


 


 


 Pantoprazole


 Sodium


  (Protonix Tab)  40 mg  DAILY


 PO  3/20/18 09:00


 4/19/18 08:59   


 


 


 Ranitidine HCl


  (zANTac TAB)  150 mg  BID  PRN


 PO  3/20/18 01:45


 4/19/18 01:44   


 


 


 Warfarin Sodium


  (Coumadin Tab)  2.5 mg  MoWeFr@1600


 PO  3/21/18 16:00


 4/20/18 15:59   


 


 


 Warfarin Sodium


  (Coumadin Tab)  5 mg  SuTuThSa@1600


 PO  3/20/18 16:00


 4/19/18 15:59   


 


 


 Miscellaneous


  (Iv Fluids


 Completed)  1 ea  PRN  PRN


 N/A  3/20/18 03:15


 3/20/19 03:14   


 











Objective


Vital Signs











  Date Time  Temp Pulse Resp B/P (MAP) Pulse Ox O2 Delivery O2 Flow Rate FiO2


 


3/20/18 08:00      Room Air  


 


3/20/18 07:25 36.8 71 16 130/81 (97) 95 Room Air  


 


3/20/18 06:34    144/95 (111)    


 


3/20/18 04:00      Room Air  


 


3/20/18 02:54 36.4 85 16 175/98 (123) 94 Room Air  


 


3/20/18 02:50 36.4  16 175/98  Room Air  


 


3/20/18 02:18  78 16 149/102 97   


 


3/20/18 02:11  78 16 149/102 97 Room Air  


 


3/20/18 01:02  78      


 


3/20/18 00:49  69 18 153/93 97 Room Air  


 


3/19/18 23:14  71 18 165/101 96 Room Air  


 


3/19/18 21:42      Room Air  


 


3/19/18 21:42      Room Air  


 


3/19/18 21:16  82      


 


3/19/18 20:49 36.3 92 20 158/94 96 Room Air  











Physical Exam


General Appearance:  WD/WN, no apparent distress


Eyes:  normal inspection


ENT:  normal ENT inspection


Neck:  supple


Respiratory/Chest:  chest non-tender, lungs clear, normal breath sounds, no 

respiratory distress


Cardiovascular:  + irregularly irregular


Abdomen:  normal bowel sounds, soft, + pertinent finding (LLQ mild tenderness)


Extremities:  no pedal edema


Neurologic/Psychiatric:  alert, normal mood/affect, oriented x 3


Skin:  warm/dry





Laboratory Results





Last 24 Hours








Test


  3/19/18


21:10 3/19/18


21:20 3/20/18


03:01 3/20/18


09:42


 


White Blood Count 10.43 K/uL   8.84 K/uL  


 


Red Blood Count 4.41 M/uL   4.32 M/uL  


 


Hemoglobin 14.2 g/dL   13.8 g/dL  


 


Hematocrit 40.7 %   39.9 %  


 


Mean Corpuscular Volume 92.3 fL   92.4 fL  


 


Mean Corpuscular Hemoglobin 32.2 pg   31.9 pg  


 


Mean Corpuscular Hemoglobin


Concent 34.9 g/dl 


  


  34.6 g/dl 


  


 


 


Platelet Count 143 K/uL   139 K/uL  


 


Mean Platelet Volume 11.0 fL   11.2 fL  


 


Neutrophils (%) (Auto) 79.2 %   77.8 %  


 


Lymphocytes (%) (Auto) 11.3 %   14.1 %  


 


Monocytes (%) (Auto) 6.4 %   6.1 %  


 


Eosinophils (%) (Auto) 2.6 %   1.7 %  


 


Basophils (%) (Auto) 0.3 %   0.2 %  


 


Neutrophils # (Auto) 8.26 K/uL   6.87 K/uL  


 


Lymphocytes # (Auto) 1.18 K/uL   1.25 K/uL  


 


Monocytes # (Auto) 0.67 K/uL   0.54 K/uL  


 


Eosinophils # (Auto) 0.27 K/uL   0.15 K/uL  


 


Basophils # (Auto) 0.03 K/uL   0.02 K/uL  


 


RDW Standard Deviation 45.7 fL   44.8 fL  


 


RDW Coefficient of Variation 13.5 %   13.2 %  


 


Immature Granulocyte % (Auto) 0.2 %   0.1 %  


 


Immature Granulocyte # (Auto) 0.02 K/uL   0.01 K/uL  


 


Prothrombin Time 17.9 SECONDS    


 


Prothromb Time International


Ratio 1.7 


  


  


  


 


 


Activated Partial


Thromboplast Time 33.0 SECONDS 


  


  


  


 


 


Partial Thromboplastin Ratio 1.3    


 


Sodium Level 135 mmol/L   134 mmol/L  


 


Potassium Level 4.2 mmol/L   4.2 mmol/L  


 


Chloride Level 101 mmol/L   101 mmol/L  


 


Carbon Dioxide Level 29 mmol/L   27 mmol/L  


 


Anion Gap 6.0 mmol/L  15.0 mmol/L  6.0 mmol/L  


 


Blood Urea Nitrogen 23 mg/dl   22 mg/dl  


 


Creatinine 1.46 mg/dl   1.43 mg/dl  


 


Est Creatinine Clear Calc


Drug Dose 43.0 ml/min 


  


  43.7 ml/min 


  


 


 


Estimated GFR (


American) 51.2 


  


  52.5 


  


 


 


Estimated GFR (Non-


American 44.2 


  


  45.3 


  


 


 


BUN/Creatinine Ratio 15.5   15.4  


 


Random Glucose 106 mg/dl   113 mg/dl  


 


Calcium Level 8.9 mg/dl   8.9 mg/dl  


 


Total Bilirubin 0.8 mg/dl   1.5 mg/dl  


 


Direct Bilirubin 0.4 mg/dl    


 


Aspartate Amino Transf


(AST/SGOT) 70 U/L 


  


  215 U/L 


  


 


 


Alanine Aminotransferase


(ALT/SGPT) 47 U/L 


  


  155 U/L 


  


 


 


Alkaline Phosphatase 98 U/L   110 U/L  


 


Total Creatine Kinase 125 U/L    


 


Creatine Kinase MB 2.4 ng/ml    


 


Creatine Kinase MB Ratio 1.9    


 


Troponin I < 0.015 ng/ml   < 0.015 ng/ml  


 


Total Protein 7.8 gm/dl   7.5 gm/dl  


 


Albumin 3.9 gm/dl   3.7 gm/dl  


 


Lipase 143 U/L   115 U/L  


 


Bedside Hemoglobin  15.0 g/dl   


 


Bedside Hematocrit  44 %   


 


Bedside Sodium  137 mEq/L   


 


Bedside Potassium  4.2 mEq/L   


 


Bedside Chloride  99 mEq/L   


 


Bedside Total CO2  28 mEq/l   


 


Bedside Blood Urea Nitrogen  24 mg/dl   


 


Bedside Creatinine  1.4 mg/dl   


 


Bedside Glucose (other)  115 mg/dl   


 


Bedside Ionized Calcium (Mary)  1.16 mmol/l   


 


Globulin   3.8 gm/dl  


 


Albumin/Globulin Ratio   1.0  





GALLBLADDER-ABD LIMITED





CLINICAL HISTORY: chest pain, elevated LFTs    





TECHNIQUE: Ultrasound





COMPARISON STUDY:  None





FINDINGS: Normal gallbladder. No shadowing gallstones. Common bile duct 4 mm.


Liver is uniform throughout. Pancreas is poorly seen due to overlying bowel


content. Right kidney is negative for hydronephrosis. There is trace amount of


right perinephric fluid.





IMPRESSION:  Trace right renal perinephric fluid. Otherwise negative right upper


quadrant ultrasound. Potential mild fatty infiltration of liver 








ABD/PELVIS WITHOUT FOR STONE





HISTORY:  82 years-old Male deranged LFTs in 6 hours acutely elevated LFTs





COMPARISON: Right upper quadrant ultrasound 4/17/2017





TECHNIQUE: Multiple axial CT images of the abdomen and pelvis were obtained


without the use of IV contrast. A dose lowering technique was used consistent


with the principals of ALARA.





FINDINGS: 


Pleural calcifications are seen at the level the left lung base. Scattered areas


of hyperattenuation are noted at the bilateral lung bases with areas of adjacent


subsegmental atelectasis/scarring. Bilateral bronchial wall thickening suggests


bronchitis. Solid nodules of the left lower lobe are seen measuring up to 4 mm.


No pneumatosis or pneumoperitoneum. Imaged inferior cardiac chambers are


enlarged with coronary arterial disease. The pulmonary arteries also appear


prominent in size. There is bilateral hemidiaphragmatic elevation.





Evaluation of the solid abdominal organs including the liver is limited without


the use of IV contrast. Within the limitations of the study, the liver, spleen,


gallbladder and adrenal glands are within normal limits. There is mild


nonspecific mesenteric edema adjacent to the gallbladder neck within the


distribution of the pasha hepatis. Moderate generalized pancreatic atrophy. No


pancreatic ductal dilation. No intrahepatic biliary ductal dilation.





Mild nonspecific bilateral perinephric stranding. 4 mm fatty attenuating lesion


of the interpolar right kidney suggests renal angiomyolipoma. Ureters are


unremarkable. No renal calculi or obstructive uropathy. Decompressed urinary


bladder lumen. Phleboliths of the pelvis.





Moderate atherosclerosis of the aorta without aneurysm. No bulky adenopathy.


Fluid is noted within the distal esophagus. No bowel obstruction or focal bowel


wall thickening identified. Normal appendix.





Soft tissues are unremarkable. Bones appear mildly demineralized. Remote


left-sided rib fractures. Severe facet arthrosis of the lower lumbar spine with


remote bilateral pars defects at L5. 5 mm anterolisthesis L5 on S1. Lucency


involving the left aspect of the L2 vertebral body suggests a Schmorl's node.





IMPRESSION: 


1. Mild nonspecific stranding adjacent to the gallbladder neck and pasha


hepatis. No cholelithiasis or CT evidence of acute cholecystitis.


2. No bowel obstruction or focal bowel wall thickening.


3. Additional findings as above.











The above report was generated using voice recognition software. It may contain


grammatical, syntax or spelling errors.











Electronically signed by:  Jovon Krishna M.D.


3/20/2018 7:11 AM





Dictated Date/Time:  3/20/2018 7:03 AM





HEPATOBILIARY EF IMAGING





CLINICAL HISTORY: 82 years-old Male presenting with Deranged LFTs worse since


admission. 





TECHNIQUE: Dynamic imaging of the gallbladder was initiated 65 minutes after


administration of 5.5 mCi of technetium 99m Choletec. Imaging was obtained every


5 minutes over a span of 40 minutes. 1.9 mcg of sincalide was injected 5 minutes


prior to the start of imaging. The gallbladder ejection fraction was calculated.





COMPARISON: CT from earlier the same day as well as ultrasound from 3/19/2018.





FINDINGS: 


The hepatobiliary scan shows normal filling of the gallbladder at the start of


imaging. Expected activity within the bowel also noted. However, gallbladder


ejection fraction measures 28% (unequivocally normal greater than 50%,


unequivocally abnormal less than 35%).





IMPRESSION: 


1. Abnormal gallbladder ejection fraction. Findings consistent with chronic


cholecystitis. 











Electronically signed by:  Syd Butterfield M.D.


3/20/2018 2:44 PM





Assessment and Plan


81 yo M with pMHx paroxysmal a.fib, CKD, HTN, presented to the ER for 

evaluation of chest pain which started while at rest. denied any N/V/abdominal 

pain. WAs noted to have an elevation of liver enzymes overnight and CT abd/

pelvis and HIDA were ordered aftera GB US was negative.





Precordial Chest pain


- Serial troponin neg


- Echo pending


- EKG prn chest pain





Chronic cholecystitis: Deranged LFTs


- GB US: Trace right renal perinephric fluid. Otherwise negative right upper 

quadrant ultrasound. Potential mild fatty infiltration of liver 


- Abd CT :. Mild nonspecific stranding adjacent to the gallbladder neck and 

pasha hepatis. No cholelithiasis or CT evidence of acute cholecystitis.


- HIDA:Abnormal gallbladder ejection fraction. Findings consistent with chronic 

cholecystitis. 


- General surgery consult





A.fib


- Continue metoprolol and warfarin


- Trend INR





CAD/HTN


- Continue aspirin, metoprolol





GERD


- Continue pantoprazole and ranitidine





CKD


- Creatinine at baseline ~1.4





Hypothyroidism


- patient not on levothyroxine





VTE ppx


- SCDs





FULL CODE














Resident Physician Supervision Note:





I interviewed and examined the patient. Discussed with Dr. Becerril and agree 

with findings and plan as documented in the note. Any exceptions or 

clarifications are listed here: None





Documented By:  Gt Cartwright


feeling ok now.  LFTs discussed with pt, pending HIDA this AM.  once HIDA back d

/w pt again.  d/w GI who noted really most appropriate for surgical consult (no 

indications for ERCP/etc perioperatively).  pt notes he walks 2 miles a day.


vitals noted nad breathing unlabored no pallor or icterus, no RUQ guarding/

rebound





cholecystitis


-appears to have been cause of CP - mild but appearing to be evolving since he 

came in


-surgical consult pending


-medically acceptable risk for cholecystectomy; can reverse coumadin 

perioperatively as well (d/w pt)





Resident Tracking


Resident Involvement:  Resident Care Provided


Care Provided:  Adult Hospital Medicine

## 2018-03-20 NOTE — DIAGNOSTIC IMAGING REPORT
HEPATOBILIARY EF IMAGING



CLINICAL HISTORY: 82 years-old Male presenting with Deranged LFTs worse since

admission. 



TECHNIQUE: Dynamic imaging of the gallbladder was initiated 65 minutes after

administration of 5.5 mCi of technetium 99m Choletec. Imaging was obtained every

5 minutes over a span of 40 minutes. 1.9 mcg of sincalide was injected 5 minutes

prior to the start of imaging. The gallbladder ejection fraction was calculated.



COMPARISON: CT from earlier the same day as well as ultrasound from 3/19/2018.



FINDINGS: 

The hepatobiliary scan shows normal filling of the gallbladder at the start of

imaging. Expected activity within the bowel also noted. However, gallbladder

ejection fraction measures 28% (unequivocally normal greater than 50%,

unequivocally abnormal less than 35%).



IMPRESSION: 

1. Abnormal gallbladder ejection fraction. Findings consistent with chronic

cholecystitis. 







Electronically signed by:  Syd Butterfield M.D.

3/20/2018 2:44 PM



Dictated Date/Time:  3/20/2018 2:42 PM

## 2018-03-20 NOTE — DIAGNOSTIC IMAGING REPORT
GALLBLADDER-ABD LIMITED



CLINICAL HISTORY: chest pain, elevated LFTs    



TECHNIQUE: Ultrasound



COMPARISON STUDY:  None



FINDINGS: Normal gallbladder. No shadowing gallstones. Common bile duct 4 mm.

Liver is uniform throughout. Pancreas is poorly seen due to overlying bowel

content. Right kidney is negative for hydronephrosis. There is trace amount of

right perinephric fluid.



IMPRESSION:  Trace right renal perinephric fluid. Otherwise negative right upper

quadrant ultrasound. Potential mild fatty infiltration of liver 









The above report was generated using voice recognition software.  It may contain

grammatical, syntax or spelling errors.







Electronically signed by:  Tejinder England M.D.

3/20/2018 6:43 AM



Dictated Date/Time:  3/20/2018 6:42 AM

## 2018-03-20 NOTE — HISTORY AND PHYSICAL
History & Physical


Date & Time of Service:


Mar 20, 2018 at 01:15


Chief Complaint:


Chest Pressure And Indigestion


Primary Care Physician:


Lio Whiteside M.D.


History of Present Illness


Source:  patient


81 yo M with pMHx paroxysmal a.fib, CKD, HTN, presented to the ER for 

evaluation of chest pain. He had been sitting watching TC when he started to 

feel burning along his sternum. he attributed this to indigestion, although had 

not yet eaten dinner. He denies "chest pain," but did feel worsening back back 

simultaneously. He states that he may have pulled a muscle a several days ago, 

but the pain today was different. No radiation of pain into jaw or arm, no 

dyspnea. He felt slightly nauseous and warm, but not diaphoretic. He check his 

BP at home and found it to be 157/99. This elevated pressure plus new symptoms 

prompted him to seek evaluation. Pain persisted until arrival to ER. He 

otherwise denies fevers/chills, headaches, palpitations, abdominal pain, lower 

extremity swelling or rashes. He is tolerating diet without nausea or vomiting, 

ambulating without exacerbating symptoms, voiding without urinary symptoms and 

stooling appropriately. 





ROS is unremarkable except as noted above.





Past Medical/Surgical History


Medical Problems:


PAF (diagnosed in 2005)


HTN


Hypothyroid


GERD


BPH


CKD


Anemia





Family History


Noncontributory





Social History


Smoking Status:  Never Smoker


Smokeless Tobacco Use:  No


Alcohol Use:  none


Drug Use:  none


Marital Status:  


Housing status:  lives with family


Occupational Status:  retired





Immunizations


History of Tetanus Vaccine?:  1999


History of Pneumococcal:  No


History of Hepatitis B Vaccine:  No





Allergies


Coded Allergies:  


     No Known Allergies (Verified  Allergy, Unknown, 4/4/05)





Home Medications


Scheduled


Aspirin (Aspirin Ec), 81 MG PO DAILY


Docusate Sodium (Stool Softener), 100 MG PO TID


Losartan Potassium & Hydrochlo (Hyzaar), 1 TABLET PO DAILY


Metoprolol Succ (Toprol Xl) (Toprol-Xl), 50 MG PO DAILY


Pantoprazole (Protonix), 40 MG PO DAILY


Warfarin Sodium (Warfarin Sodium), 2.5 MG PO 3XWK


Warfarin Sodium (Warfarin Sodium), 5 MG PO 4XWK





Scheduled PRN


Alum & Mag Hydrox-Simethicone (Mylanta), 1 DOSE PO UD PRN for Indigestion


Ranitidine Hcl (Zantac), 150 MG PO BID PRN for Heartburn





Physical Exam


Vital Signs











  Date Time  Temp Pulse Resp B/P (MAP) Pulse Ox O2 Delivery O2 Flow Rate FiO2


 


3/20/18 01:02  78      


 


3/20/18 00:49  69 18 153/93 97 Room Air  


 


3/19/18 23:14  71 18 165/101 96 Room Air  


 


3/19/18 21:42      Room Air  


 


3/19/18 21:42      Room Air  


 


3/19/18 21:16  82      


 


3/19/18 20:49 36.3 92 20 158/94 96 Room Air  








General Appearance:  WD/WN, no apparent distress


Head:  normocephalic, atraumatic


Eyes:  normal inspection


ENT:  hearing grossly normal, pharynx normal


Neck:  supple, no JVD


Respiratory/Chest:  normal breath sounds, no respiratory distress, no accessory 

muscle use


Cardiovascular:  regular rate, rhythm, no murmur, normal peripheral pulses


Abdomen/GI:  normal bowel sounds, non tender, soft


Back:  normal inspection, no CVA tenderness


Extremities/Musculoskelatal:  no calf tenderness, + pedal edema, + swelling


Neurologic/Psych:  alert, normal mood/affect, oriented x 3


Skin:  normal color, warm/dry, no rash





Diagnostics


Laboratory Results





Results Past 24 Hours








Test


  3/19/18


21:10 3/19/18


21:20 Range/Units


 


 


White Blood Count 10.43  4.8-10.8  K/uL


 


Red Blood Count 4.41  4.7-6.1  M/uL


 


Hemoglobin 14.2  14.0-18.0  g/dL


 


Hematocrit 40.7  42-52  %


 


Mean Corpuscular Volume 92.3    fL


 


Mean Corpuscular Hemoglobin 32.2  25-34  pg


 


Mean Corpuscular Hemoglobin


Concent 34.9


  


  32-36  g/dl


 


 


Platelet Count 143  130-400  K/uL


 


Mean Platelet Volume 11.0  7.4-10.4  fL


 


Neutrophils (%) (Auto) 79.2   %


 


Lymphocytes (%) (Auto) 11.3   %


 


Monocytes (%) (Auto) 6.4   %


 


Eosinophils (%) (Auto) 2.6   %


 


Basophils (%) (Auto) 0.3   %


 


Neutrophils # (Auto) 8.26  1.4-6.5  K/uL


 


Lymphocytes # (Auto) 1.18  1.2-3.4  K/uL


 


Monocytes # (Auto) 0.67  0.11-0.59  K/uL


 


Eosinophils # (Auto) 0.27  0-0.5  K/uL


 


Basophils # (Auto) 0.03  0-0.2  K/uL


 


RDW Standard Deviation 45.7  36.4-46.3  fL


 


RDW Coefficient of Variation 13.5  11.5-14.5  %


 


Immature Granulocyte % (Auto) 0.2   %


 


Immature Granulocyte # (Auto) 0.02  0.00-0.02  K/uL


 


Prothrombin Time


  17.9


  


  9.0-12.0


SECONDS


 


Prothromb Time International


Ratio 1.7


  


  0.9-1.1  


 


 


Activated Partial


Thromboplast Time 33.0


  


  21.0-31.0


SECONDS


 


Partial Thromboplastin Ratio 1.3   


 


Sodium Level 135  136-145  mmol/L


 


Potassium Level 4.2  3.5-5.1  mmol/L


 


Chloride Level 101    mmol/L


 


Carbon Dioxide Level 29  21-32  mmol/L


 


Anion Gap 6.0 15.0 16-25  mmol/L


 


Blood Urea Nitrogen 23  7-18  mg/dl


 


Creatinine


  1.46


  


  0.60-1.40


mg/dl


 


Est Creatinine Clear Calc


Drug Dose 43.0


  


   ml/min


 


 


Estimated GFR (


American) 51.2


  


   


 


 


Estimated GFR (Non-


American 44.2


  


   


 


 


BUN/Creatinine Ratio 15.5  10-20  


 


Random Glucose 106  70-99  mg/dl


 


Calcium Level 8.9  8.5-10.1  mg/dl


 


Total Bilirubin 0.8  0.2-1  mg/dl


 


Direct Bilirubin 0.4  0-0.2  mg/dl


 


Aspartate Amino Transf


(AST/SGOT) 70


  


  15-37  U/L


 


 


Alanine Aminotransferase


(ALT/SGPT) 47


  


  12-78  U/L


 


 


Alkaline Phosphatase 98    U/L


 


Total Creatine Kinase 125    U/L


 


Creatine Kinase MB 2.4  0.5-3.6  ng/ml


 


Creatine Kinase MB Ratio 1.9  0-3.0  


 


Troponin I < 0.015  0-0.045  ng/ml


 


Total Protein 7.8  6.4-8.2  gm/dl


 


Albumin 3.9  3.4-5.0  gm/dl


 


Lipase 143    U/L


 


Bedside Hemoglobin  15.0 14.0-18.0  g/dl


 


Bedside Hematocrit  44 42-52  %


 


Bedside Sodium  137 135-144  mEq/L


 


Bedside Potassium  4.2 3.3-5.0  mEq/L


 


Bedside Chloride  99 101-112  mEq/L


 


Bedside Total CO2  28 24-31  mEq/l


 


Bedside Blood Urea Nitrogen  24 7-18  mg/dl


 


Bedside Creatinine  1.4 0.6-1.3  mg/dl


 


Bedside Glucose (other)  115 70-99  mg/dl


 


Bedside Ionized Calcium (Mary)


  


  1.16


  1.12-1.32


mmol/l











Diagnostic Radiology


CHEST ONE VIEW PORTABLE





CLINICAL HISTORY: Atypical chest pain    





COMPARISON STUDY:  April 2006





FINDINGS: The cardiac and mediastinal contours remain stable. There is stable


elevation the right hemidiaphragm. There is no failure. There is no lobar


consolidation. There are no pleural effusions. There are mild basilar


atelectatic changes.[ 





IMPRESSION: No active disease in the chest.











GB U/S - stat rad


No gallstones or sonographic evidence of cholecystitis


Right perinephric fluid. No right hydronephrosis


Coarse liver parenchyma





Impression


Assessment and Plan


81 yo M with pMHx paroxysmal a.fib, CKD, HTN, presented to the ER for 

evaluation of chest pain. He had been sitting watching TC when he started to 

feel burning along his sternum. he attributed this to indigestion, although had 

not yet eaten dinner. He denies "chest pain," but did feel worsening back back 

simultaneously. He states that he may have pulled a muscle a several days ago, 

but the pain today was different. No radiation of pain into jaw or arm, no 

dyspnea. He felt slightly nauseous and warm, but not diaphoretic. He check his 

BP at home and found it to be 157/99. This elevated pressure plus new symptoms 

prompted him to seek evaluation. Pain persisted until arrival to ER. He 

otherwise denies fevers/chills, headaches, palpitations, abdominal pain, lower 

extremity swelling or rashes. He is tolerating diet without nausea or vomiting, 

ambulating without exacerbating symptoms, voiding without urinary symptoms and 

stooling appropriately. 





Chest pain


- Serial troponin


- Echo ordered


- EKG prn chest pain





Deranged LFTs


- mildly elevated AST on admission


- U/S GB (stat rad) only found right perinephric fluid, without right 

hydronephrosis - await final report


- AM labs (6 hours later) show significantly elevated bilirub, AST, ALT


- Ordered further imaging - CT abdo/pelv w/o contrast and HIDA scan - patient 

NPO (no water) for this scan


- Trend LFTs


- Consider GI consult





A.fib


- Continue metoprolol and warfarin


- Trend INR





CAD/HTN


- Continue aspirin, metoprolol





GERD


- Continue pantoprazole and ranitidine





CKD


- Creatinine at baseline ~1.4





Hypothyroidism


- patient not on levothyroxine





VTE ppx


- SCDs





FULL CODE








Attending addendum:








I have physically seen this patient, have supervised the medical residents 

activities, and agree with the H&P unless as otherwise noted.





Assessment and Plan:





CAD/hypertension/atrial fibrillation/precordial chest pain--


The patient will be admitted to telemetry for serial cardiac enzymes, serial  

EKG's, cardiac rhythm monitoring and a 2-D echocardiogram with Dopplers.


Continue metoprolol, aspirin and warfarin.





Progressively abnormal LFTs--


As noted above, order CT of abdomen and pelvis and HIDA EF.


Main concern would be cholecystitis at this point, which could also explain his 

symptoms of worse nausea and reflux.





Advanced Directives


Existing Advance Directive:  Yes


Existing Living Will:  Yes


Existing Power of :  Yes


Existing Health Care Proxy:  Yes (Wife Felisa)





Resuscitation Status


Full code





VTE Prophylaxis


Will order VTE Prophylaxis:  Yes





Resident Tracking


Resident Involvement:  Resident Care Provided


Care Provided:  Adult Hospital Medicine

## 2018-03-21 VITALS
TEMPERATURE: 97.88 F | HEART RATE: 85 BPM | OXYGEN SATURATION: 96 % | DIASTOLIC BLOOD PRESSURE: 86 MMHG | SYSTOLIC BLOOD PRESSURE: 131 MMHG

## 2018-03-21 VITALS
HEART RATE: 85 BPM | SYSTOLIC BLOOD PRESSURE: 131 MMHG | TEMPERATURE: 97.88 F | OXYGEN SATURATION: 96 % | DIASTOLIC BLOOD PRESSURE: 86 MMHG

## 2018-03-21 VITALS
SYSTOLIC BLOOD PRESSURE: 117 MMHG | HEART RATE: 80 BPM | OXYGEN SATURATION: 93 % | DIASTOLIC BLOOD PRESSURE: 78 MMHG | TEMPERATURE: 97.34 F

## 2018-03-21 VITALS
SYSTOLIC BLOOD PRESSURE: 100 MMHG | DIASTOLIC BLOOD PRESSURE: 57 MMHG | TEMPERATURE: 98.24 F | HEART RATE: 77 BPM | OXYGEN SATURATION: 95 %

## 2018-03-21 VITALS
TEMPERATURE: 97.34 F | HEART RATE: 88 BPM | SYSTOLIC BLOOD PRESSURE: 143 MMHG | OXYGEN SATURATION: 96 % | DIASTOLIC BLOOD PRESSURE: 80 MMHG

## 2018-03-21 LAB
ALBUMIN SERPL-MCNC: 3.7 GM/DL (ref 3.4–5)
ALP SERPL-CCNC: 114 U/L (ref 45–117)
ALT SERPL-CCNC: 132 U/L (ref 12–78)
AST SERPL-CCNC: 98 U/L (ref 15–37)
BASOPHILS # BLD: 0.03 K/UL (ref 0–0.2)
BASOPHILS NFR BLD: 0.4 %
BUN SERPL-MCNC: 22 MG/DL (ref 7–18)
CALCIUM SERPL-MCNC: 9 MG/DL (ref 8.5–10.1)
CO2 SERPL-SCNC: 26 MMOL/L (ref 21–32)
CREAT SERPL-MCNC: 1.45 MG/DL (ref 0.6–1.4)
EOS ABS #: 0.18 K/UL (ref 0–0.5)
EOSINOPHIL NFR BLD AUTO: 147 K/UL (ref 130–400)
GLUCOSE SERPL-MCNC: 85 MG/DL (ref 70–99)
HCT VFR BLD CALC: 41.8 % (ref 42–52)
HGB BLD-MCNC: 14.4 G/DL (ref 14–18)
IG#: 0.01 K/UL (ref 0–0.02)
IMM GRANULOCYTES NFR BLD AUTO: 23.5 %
LYMPHOCYTES # BLD: 1.86 K/UL (ref 1.2–3.4)
MCH RBC QN AUTO: 31.9 PG (ref 25–34)
MCHC RBC AUTO-ENTMCNC: 34.4 G/DL (ref 32–36)
MCV RBC AUTO: 92.7 FL (ref 80–100)
MONO ABS #: 0.56 K/UL (ref 0.11–0.59)
MONOCYTES NFR BLD: 7.1 %
NEUT ABS #: 5.29 K/UL (ref 1.4–6.5)
NEUTROPHILS # BLD AUTO: 2.3 %
NEUTROPHILS NFR BLD AUTO: 66.6 %
PMV BLD AUTO: 10.7 FL (ref 7.4–10.4)
POTASSIUM SERPL-SCNC: 4.2 MMOL/L (ref 3.5–5.1)
PROT SERPL-MCNC: 7.5 GM/DL (ref 6.4–8.2)
RED CELL DISTRIBUTION WIDTH CV: 13.4 % (ref 11.5–14.5)
RED CELL DISTRIBUTION WIDTH SD: 45.3 FL (ref 36.4–46.3)
SODIUM SERPL-SCNC: 135 MMOL/L (ref 136–145)
WBC # BLD AUTO: 7.93 K/UL (ref 4.8–10.8)

## 2018-03-21 RX ADMIN — PANTOPRAZOLE SCH MG: 40 TABLET, DELAYED RELEASE ORAL at 07:40

## 2018-03-21 RX ADMIN — METOPROLOL SUCCINATE SCH MG: 50 TABLET, EXTENDED RELEASE ORAL at 07:40

## 2018-03-21 RX ADMIN — Medication SCH MG: at 07:40

## 2018-03-21 RX ADMIN — DOCUSATE SODIUM SCH MG: 100 CAPSULE, LIQUID FILLED ORAL at 07:41

## 2018-03-21 RX ADMIN — DOCUSATE SODIUM SCH MG: 100 CAPSULE, LIQUID FILLED ORAL at 14:18

## 2018-03-21 NOTE — DISCHARGE SUMMARY
Discharge Summary


Date of Service


Mar 21, 2018.





Discharge Summary


Admission Date:


Mar 20, 2018 at 01:47


Discharge Date:  Mar 21, 2018


Discharge Disposition:  Home


Principal Diagnosis:  substernal chest pain


Problems/Secondary Diagnoses:


chronic cholecystitis, GERD


Immunizations:  


   History of Tetanus Vaccine?:  1999


   History of Pneumococcal:  No


   History of Hepatitis B Vaccine:  No


Procedures:


[~ rep ct add3]]


GALLBLADDER-ABD LIMITED





CLINICAL HISTORY: chest pain, elevated LFTs    





TECHNIQUE: Ultrasound





COMPARISON STUDY:  None





FINDINGS: Normal gallbladder. No shadowing gallstones. Common bile duct 4 mm.


Liver is uniform throughout. Pancreas is poorly seen due to overlying bowel


content. Right kidney is negative for hydronephrosis. There is trace amount of


right perinephric fluid.





IMPRESSION:  Trace right renal perinephric fluid. Otherwise negative right upper


quadrant ultrasound. Potential mild fatty infiltration of liver 











HEPATOBILIARY EF IMAGING





CLINICAL HISTORY: 82 years-old Male presenting with Deranged LFTs worse since


admission. 





TECHNIQUE: Dynamic imaging of the gallbladder was initiated 65 minutes after


administration of 5.5 mCi of technetium 99m Choletec. Imaging was obtained every


5 minutes over a span of 40 minutes. 1.9 mcg of sincalide was injected 5 minutes


prior to the start of imaging. The gallbladder ejection fraction was calculated.





COMPARISON: CT from earlier the same day as well as ultrasound from 3/19/2018.





FINDINGS: 


The hepatobiliary scan shows normal filling of the gallbladder at the start of


imaging. Expected activity within the bowel also noted. However, gallbladder


ejection fraction measures 28% (unequivocally normal greater than 50%,


unequivocally abnormal less than 35%).





IMPRESSION: 


1. Abnormal gallbladder ejection fraction. Findings consistent with chronic


cholecystitis. 


ABD/PELVIS WITHOUT FOR STONE





HISTORY:  82 years-old Male deranged LFTs in 6 hours acutely elevated LFTs





COMPARISON: Right upper quadrant ultrasound 4/17/2017





TECHNIQUE: Multiple axial CT images of the abdomen and pelvis were obtained


without the use of IV contrast. A dose lowering technique was used consistent


with the principals of GIGI.





FINDINGS: 


Pleural calcifications are seen at the level the left lung base. Scattered areas


of hyperattenuation are noted at the bilateral lung bases with areas of adjacent


subsegmental atelectasis/scarring. Bilateral bronchial wall thickening suggests


bronchitis. Solid nodules of the left lower lobe are seen measuring up to 4 mm.


No pneumatosis or pneumoperitoneum. Imaged inferior cardiac chambers are


enlarged with coronary arterial disease. The pulmonary arteries also appear


prominent in size. There is bilateral hemidiaphragmatic elevation.





Evaluation of the solid abdominal organs including the liver is limited without


the use of IV contrast. Within the limitations of the study, the liver, spleen,


gallbladder and adrenal glands are within normal limits. There is mild


nonspecific mesenteric edema adjacent to the gallbladder neck within the


distribution of the pasha hepatis. Moderate generalized pancreatic atrophy. No


pancreatic ductal dilation. No intrahepatic biliary ductal dilation.





Mild nonspecific bilateral perinephric stranding. 4 mm fatty attenuating lesion


of the interpolar right kidney suggests renal angiomyolipoma. Ureters are


unremarkable. No renal calculi or obstructive uropathy. Decompressed urinary


bladder lumen. Phleboliths of the pelvis.





Moderate atherosclerosis of the aorta without aneurysm. No bulky adenopathy.


Fluid is noted within the distal esophagus. No bowel obstruction or focal bowel


wall thickening identified. Normal appendix.





Soft tissues are unremarkable. Bones appear mildly demineralized. Remote


left-sided rib fractures. Severe facet arthrosis of the lower lumbar spine with


remote bilateral pars defects at L5. 5 mm anterolisthesis L5 on S1. Lucency


involving the left aspect of the L2 vertebral body suggests a Schmorl's node.





IMPRESSION: 


1. Mild nonspecific stranding adjacent to the gallbladder neck and pasha


hepatis. No cholelithiasis or CT evidence of acute cholecystitis.


2. No bowel obstruction or focal bowel wall thickening.


3. Additional findings as above.


Consultations:


General Surgery





Medication Reconciliation


Continued Medications:  


Alum & Mag Hydrox-Simethicone (Mylanta) 1 Josefa Josefa


1 DOSE PO UD PRN for Indigestion


TAKE PER PACKAGE DIRECTIONS


Aspirin (Aspirin Ec) 81 Mg Tab


81 MG PO DAILY





Docusate Sodium (Stool Softener) 100 Mg Cap


100 MG PO TID





Losartan Potassium & Hydrochlo (Hyzaar) 1 Tab Tab


1 TABLET PO DAILY





Metoprolol Succ (Toprol Xl) (Toprol-Xl) 50 Mg Tabcr


50 MG PO DAILY, TAB





Pantoprazole (Protonix) 40 Mg Tab


40 MG PO DAILY





Ranitidine Hcl (Zantac) 150 Mg Tab


150 MG PO BID PRN for Heartburn, TAB





Warfarin Sodium (Warfarin Sodium) 5 Mg Tab


2.5 MG PO 3XWK, TAB


TAKE HALF A TABLET (2.5 MG) EVERY MONDAY,WEDNESDAY AND FRIDAY OR AS


 OTHERWISE DIRECTED TO TAKE BY ANTICOAGULATION CLINIC/MD


Warfarin Sodium (Warfarin Sodium) 5 Mg Tab


5 MG PO 4XWK, TAB


TAKE 5 MG EVERY SUNDAY,TUESDAY,THURSDAY AND SATURDAY OR AS OTHERWISE


 DIRECTED TO TAKE BY ANTICOAGULATION CLINIC/MD








Discharge Exam


denies any CP, SOB, nausea, vomiting , abdominal pain


Review of Systems:  


   Constitutional:  No fever, No chills


   Eyes:  No worsening of vision


   ENT:  No hearing loss


   Respiratory:  No cough, No sputum


   Cardiovascular:  No chest pain


   Abdomen:  No pain, No nausea, No vomiting, No diarrhea


   Musculoskeletal:  No joint pain


   Genitourinary - Male:  No hematuria, No dysuria, No urinary frequency


   Neurologic:  No memory loss


   Psychiatric:  No depression symptoms


   Endocrine:  No fatigue


   Hematologic / Lymphatic:  No abnormal bleeding/bruising


Physical Exam:  


   General Appearance:  WD/WN, no apparent distress


   Eyes:  normal inspection


   ENT:  normal ENT inspection, hearing grossly normal


   Neck:  supple


   Respiratory/Chest:  chest non-tender, lungs clear, normal breath sounds


   Cardiovascular:  regular rate, rhythm


   Abdomen / GI:  normal bowel sounds, non tender, soft


   Extremities:  no pedal edema


   Neurologic/Psychiatric:  alert, normal mood/affect, oriented x 3


   Skin:  normal color





Hospital Course


82-year-old male with a past medical history of paroxysmal atrial fibrillation, 

chronic kidney disease, hypertension presented to the ER for evaluation of 

chest pain.  It started while at rest.  Denied any radiation to jaw/arm, 

shortness of breath, palpitations, dizziness or lightheadedness.  Denied any 

fevers or chills or coughing he was admitted to telemetry and monitored for 

ACS.  troponins were trended and were negative 3.  He was noted to have an 

elevation of his liver enzymes


And underwent an gallbladder ultrasound, abdominal CT which revealed stranding 

around the neck of the gallbladder and also underwent a HIDA scan which 

revealed a decreased ejection fraction of 28% suggestive of chronic 

cholecystitis.  General surgery was consulted who determined that the chest 

pain was likely secondary from GERD/PUD and recommended him to follow-up as an 

outpatient if his symptoms returned for an outpatient evaluation for surgery.


He remained asymptomatic during rest of his hospital stay and was discharged in 

a stable condition.  He was recommended to continue rest of his home 

medications and follow-up with his PCP in about a week.  He was recommended to 

monitor for right upper quadrant abdominal pain and modify diet to low-fat to 

prevent cholecystitis.  He was advised to seek medical attention if his 

symptoms returned.





Resident Physician Supervision Note:





I interviewed and examined the patient. Discussed with Dr. Becerril and agree 

with findings and plan as documented in the note. Any exceptions or 

clarifications are listed here: None





Documented By:  Gt Cartwright


feeling better, discussed plan - for now outpt monitoring and management, 

return if sx return or worse, outpt PCP and surgery f/u  - likely elective 

choley in future but no need to do so now


vitals noted nad breathing unlabored





mild flare of what apperas to be chronic cholecystitis


-improving, safe for home/outpt management at this time, no need for emergent 

choley, outpt surgery f/u


Total Time Spent:  Less than 30 minutes


This includes examination of the patient, discharge planning, medication 

reconciliation, and communication with other providers.





Discharge Instructions


Please refer to the electronic Patient Visit Report (Discharge Instructions) 

for additional information.





Follow-Up


Follow up with PCP in about a week.


Follow up with General surgery for outpatient eval for surgery if symptoms 

return





Additional Copies To


Lio Whiteside M.D.





Resident Tracking


Resident Involvement:  Resident Care Provided


Care Provided:  [facility]

## 2018-03-21 NOTE — ECHOCARDIOGRAM REPORT
*NOTICE TO RECEIVING PARTY AGENCY**  This information is strictly Confidential and protected under 
[place] law.  [place] law prohibits you from making any further disclosure of this 
information unless further disclosure is expressly permitted by the written consent of the person to 
whom it pertains or is authorized by law.  A general authorization for the release of medical or 
other information is not sufficient for this purpose.  Hospital accepts no responsibility if the 
information is made available to any other person, INCLUDING THE PATIENT.



Interpretation Summary

  *  Name: JACKSON PRASAD  Study Date: 2018 10:27 AM  BP: 130/81 mmHg

  *  MRN: [id number]  Patient Location: [facility]  HR: 71

  *  : 1935 (M/d/yyyy)  Gender: Male  Height: 67 in

  *  Age: 82 yrs  Ethnicity: [place]  Weight: 210 lb

  *  Ordering Physician: Maya Lanier.

  *  Referring Physician: Self, Referred

  *  Performed By: Ijeoma Pal RDCS

  *  Accession# [id number]  Account# [id number]

  *  Reason For Study: Chest Pain

  *  BSA: 2.1 m2

  *  -- Conclusions --

  *  1. Normal left ventricular size and systolic function.  EF 55-60%.  No regional wall motion 
abnormalities.  Mild concentric left ventricular hypertrophy.

  *  2. The left atrium is moderately dilated.

  *  3. There is mild to moderate mitral regurgitation.

  *  4. There is mild to moderate tricuspid regurgitation.

  *  5. Aortic valve sclerosis moderate, without significant aortic valvular stenosis.

  *  6. Normal estimated right ventricular systolic pressure.

  *  7. No prior study available for comparison.

Procedure Details

  *  A complete two-dimensional transthoracic echocardiogram was performed (2D, M-mode, Doppler and 
color flow Doppler).

Left Ventricle

  *  Normal left ventricular size and systolic function.  EF 55-60%.  No regional wall motion 
abnormalities.  Mild concentric left ventricular hypertrophy.

Right Ventricle

  *  The right ventricle is normal in size and function.

  *  The right ventricular systolic function is normal as assessed by tricuspid annular plane 
systolic excursion (TAPSE) (normal >1.5 cm).

Atria

  *  The left atrium is moderately dilated.

  *  Right atrial size is normal.

  *  There is no evidence of atrial septal defect, but resolution does not allow assessment for a 
patent foramen ovale.

Mitral Valve

  *  The mitral valve is grossly normal.

  *  There is no mitral valve stenosis.

  *  There is mild to moderate mitral regurgitation.

Tricuspid Valve

  *  The tricuspid valve is not well visualized, but is grossly normal.

  *  There is no tricuspid stenosis.

  *  There is mild to moderate tricuspid regurgitation.

Aortic Valve

  *  The aortic valve is trileaflet.

  *  Aortic valve sclerosis moderate, without significant aortic valvular stenosis.

  *  No hemodynamically significant valvular aortic stenosis.

  *  No aortic regurgitation is present.

Pulmonic Valve

  *  The pulmonary valve is inadequately visualized, but the Doppler data is adequate for 
interpretation.

  *  There is no pulmonic valvular stenosis.

  *  Mild pulmonic valvular regurgitation.

Great Vessels

  *  The aortic root is normal size.

Pericardium/Pleural

  *  There is no pericardial effusion.

Great Vessels

  *  IVC normal in size.



MMode 2D Measurements and Calculations

IVSd 1.3 cm

IVSs 1.2 cm



LVIDd 4.0 cm

LVIDs 2.6 cm

LVPWd 1.2 cm

LVPWs 1.5 cm



IVS/LVPW 1.1 

FS 35.2 %

EDV(Teich) 69.5 ml

ESV(Teich) 24.2 ml

EF(Teich) 65.1 %



EDV(cubed) 63.4 ml

ESV(cubed) 17.3 ml

EF(cubed) 72.8 %

% IVS thick -9.27 %

% LVPW thick 30.6 %





LV mass(C)d 172.7 grams

LV mass(C)dI 83.7 grams/m\S\2

LV mass(C)s 110.1 grams

LV mass(C)sI 53.4 grams/m\S\2



SV(Teich) 45.3 ml

SI(Teich) 21.9 ml/m\S\2

SV(cubed) 46.2 ml

SI(cubed) 22.4 ml/m\S\2



Ao root diam 3.4 cm

Ao root area 9.2 cm\S\2

ACS 1.5 cm

LA dimension 4.6 cm



LA/Ao 1.3 





LVAd ap4 27.7 cm\S\2

LVLd ap4 7.3 cm

EDV(MOD-sp4) 88.7 ml

EDV(sp4-el) 88.9 ml

LVAs ap4 17.8 cm\S\2

LVLs ap4 7.4 cm

ESV(MOD-sp4) 41.8 ml

ESV(sp4-el) 36.5 ml

EF(MOD-sp4) 52.8 %

EF(sp4-el) 59.0 %



LVAd ap2 22.1 cm\S\2

LVLd ap2 7.3 cm

EDV(MOD-sp2) 59.9 ml

EDV(sp2-el) 57.1 ml

LVAs ap2 11.8 cm\S\2

LVLs ap2 5.6 cm

ESV(MOD-sp2) 21.5 ml

ESV(sp2-el) 21.0 ml

EF(MOD-sp2) 64.2 %

EF(sp2-el) 63.3 %



LVLd %diff -0.39 %

EDV(MOD-bp) 73.6 ml

LVLs %diff -30.49 %

ESV(MOD-bp) 33.7 ml

EF(MOD-bp) 54.2 %



SV(MOD-sp4) 46.9 ml

SI(MOD-sp4) 22.7 ml/m\S\2





SV(MOD-sp2) 38.4 ml

SI(MOD-sp2) 18.6 ml/m\S\2



SV(MOD-bp) 39.8 ml

SI(MOD-bp) 19.3 ml/m\S\2



SV(sp4-el) 52.5 ml

SI(sp4-el) 25.4 ml/m\S\2



SV(sp2-el) 36.1 ml

SI(sp2-el) 17.5 ml/m\S\2







Doppler Measurements and Calculations

MV E max manny 87.6 cm/sec



MV dec time 0.14 sec



Ao V2 max 88.9 cm/sec

Ao max PG 3.2 mmHg

Ao max PG (full) 1.4 mmHg



LV V1 max PG 1.8 mmHg





LV V1 max 67.2 cm/sec



MR max manny 375.3 cm/sec

MR max PG 56.3 mmHg

MR mean manny 315.3 cm/sec

MR mean PG 43.1 mmHg

MR .4 cm



MR PISA 0.80 cm\S\2

MR PISA radius 0.36 cm



PA V2 max 78.2 cm/sec

PA max PG 2.4 mmHg





PI max manny 179.4 cm/sec

PI max PG 13.0 mmHg

PI dec slope 132.7 cm/sec\S\2

PI P1/2t 396.1 msec



TR max manny 222.6 cm/sec

## 2018-03-21 NOTE — DISCHARGE INSTRUCTIONS
Discharge Instructions


Date of Service


Mar 21, 2018.





Admission


Reason for Admission:  Substernal Chest Pain





Discharge


Discharge Diagnosis / Problem:  Substernal chest pain, chronic cholecystitis, 

GERD





Discharge Goals


Goal(s):  Decrease discomfort, Improve function





Activity Recommendations


Activity Limitations:  resume your previous activity





.





Instructions / Follow-Up


Instructions / Follow-Up


You were admitted with complaints of chest pain and were monitored and 

evaluated for heart disease.  You were noted to have an increase in your liver 

enzymes and had further evaluation.  your heart enzymes were normal and so was 

your EKG. the ultrasound of the gallbladder was unremarkable. 


You then underwent a CT scan of her abdomen and pelvis which revealed some 

changes consistent with chronic cholecystitis which was confirmed after the 

HIDA scan.  General surgery was consulted who recommended outpatient follow-up 

and did not recommend surgery.  It is likely your chest pain was secondary to 

acid reflux or inflammation of the gallbladder.





Chronic cholecystitis:


-Please monitor for pain in your right upper abdomen area and follow up with 

general surgery as an outpatient.


-Try to modify your diet to low-fat





Atrial fibrillation


- Continue metoprolol and Coumadin





High blood pressure 


-Continue losartan





GERD


- Continue pantoprazole and ranitidine








Please follow-up with your family doctor in the next week


Please follow-up with general surgery as an outpatient





Current Hospital Diet


Patient's current hospital diet: AHA Diet (Heart Healthy)





Discharge Diet


Recommended Diet:  AHA Diet (Heart Healthy)





Pending Studies


Studies pending at discharge:  yes


List of pending studies:  


Echocardiogram results





Medical Emergencies








.


Who to Call and When:





Medical Emergencies:  If at any time you feel your situation is an emergency, 

please call 911 immediately.





.





Non-Emergent Contact


Non-Emergency issues call your:  Primary Care Provider





.


.








"Provider Documentation" section prepared by Henna Becerril.








.





Resident Tracking


Resident Involvement:  Resident Care Provided


Care Provided:  Adult Hospital Medicine
